# Patient Record
(demographics unavailable — no encounter records)

---

## 2025-03-13 NOTE — PHYSICAL EXAM
[Chaperone Present] : A chaperone was present in the examining room during all aspects of the physical examination [Abnormal] : Abdomen: Abnormal [Absent] : Uterus: Absent [Normal] : Recto-Vaginal Exam: Normal [FreeTextEntry2] : Tracey Vaz MA [de-identified] : Medial pain at the superior aspect of the inguinal region.

## 2025-03-13 NOTE — HISTORY OF PRESENT ILLNESS
[FreeTextEntry1] : 63 y/o patient with h/o HG cervical dysplasia, now s/p definitive hysterectomy. Given that final path was negative, plan was to resume routine well woman care with primary GYN.  She recently contacted my office with c/o sharp, right sided pelvic discomfort, more intense when lying down or getting up over the last week. No increase in activity.  No inciting events prior to the onset of the pain.  Pain limiting ability to move from supine to sitting position. No changes bowel/bladder habits. No vaginal bleeding.  She saw PCP and they think it could be related to scar tissue, no imaging was ordered.  Patient has not yet trialed anything OTC for discomfort, therefore PA advised trial of OTC pain medications to see if improvement as this may be MSK in nature, although the pt prefers to be evaluated in the office.

## 2025-03-13 NOTE — ASSESSMENT
[FreeTextEntry1] : 65 y/o patient s/p RA TL BSO 7/22/24 for CHET II-III with recently developed right sided pelvic discomfort, sharp in nature and more intense when lying down or getting up. No inciting events prior to onset of pain.   CT A/P ordered to r/o hernia as the reported symptoms are specific to when she is seated & has history of doing heavy lifting.  I explained that I do not suspect the pain is related to scar tissue, as she would have experienced this closer to surgery and scar tissue-related discomfort to this degree would not develop so far after surgery.

## 2025-03-13 NOTE — END OF VISIT
[FreeTextEntry3] : All Medical record entries made by the Scribe were at my, Dr. Stoddard', direction and personally dictated by me on [date] . I have reviewed the chart and agree that the record accurately reflects my personal performance of the history, physical exam, assessment and plan. I have also personally directed, reviewed, and agreed with the discharge instructions.

## 2025-03-13 NOTE — REASON FOR VISIT
[FreeTextEntry1] : [facility] [phone] at [address] [place] [address]   h/o HG cevical dysplasia (CHET 2 & 3) - 1/23/24 s/p RA JOANNA, BSO - 7/22/24  Recently developed pain

## 2025-03-13 NOTE — PHYSICAL EXAM
[Chaperone Present] : A chaperone was present in the examining room during all aspects of the physical examination [Abnormal] : Abdomen: Abnormal [Absent] : Uterus: Absent [Normal] : Recto-Vaginal Exam: Normal [FreeTextEntry2] : Tracey Vaz MA [de-identified] : Medial pain at the superior aspect of the inguinal region.

## 2025-03-13 NOTE — REASON FOR VISIT
[FreeTextEntry1] : [facility] [phone] at [address] [place] [address]   h/o HG cevical dysplasia (CHET 2 & 3) - 1/23/24 s/p RA JOANNA, BSO - 7/22/24  Recently developed pain

## 2025-03-13 NOTE — REASON FOR VISIT
[FreeTextEntry1] : [facility] [phone] at [address] [place] [address]   h/o HG cevical dysplasia (CHET 2 & 3) - 1/23/24 s/p RA JOANNA, BSO - 7/22/24  Recently developed pain

## 2025-03-13 NOTE — REASON FOR VISIT
[FreeTextEntry1] : [facility] [phone] at [address] [place] [address]   h/o HG cevical dysplasia (CHET 2 & 3) - 1/23/24 s/p RA JOANNA, BSO - 7/22/24  Recently developed pain

## 2025-03-13 NOTE — PHYSICAL EXAM
[Chaperone Present] : A chaperone was present in the examining room during all aspects of the physical examination [Abnormal] : Abdomen: Abnormal [Absent] : Uterus: Absent [Normal] : Recto-Vaginal Exam: Normal [FreeTextEntry2] : Tracey Vaz MA [de-identified] : Medial pain at the superior aspect of the inguinal region.

## 2025-03-13 NOTE — PHYSICAL EXAM
[Chaperone Present] : A chaperone was present in the examining room during all aspects of the physical examination [Abnormal] : Abdomen: Abnormal [Absent] : Uterus: Absent [Normal] : Recto-Vaginal Exam: Normal [FreeTextEntry2] : Tracey Vaz MA [de-identified] : Medial pain at the superior aspect of the inguinal region.

## 2025-03-13 NOTE — ASSESSMENT
[FreeTextEntry1] : 63 y/o patient s/p RA TL BSO 7/22/24 for CHET II-III with recently developed right sided pelvic discomfort, sharp in nature and more intense when lying down or getting up. No inciting events prior to onset of pain.   CT A/P ordered to r/o hernia as the reported symptoms are specific to when she is seated & has history of doing heavy lifting.  I explained that I do not suspect the pain is related to scar tissue, as she would have experienced this closer to surgery and scar tissue-related discomfort to this degree would not develop so far after surgery.